# Patient Record
Sex: MALE | Race: WHITE | NOT HISPANIC OR LATINO | Employment: OTHER | URBAN - METROPOLITAN AREA
[De-identification: names, ages, dates, MRNs, and addresses within clinical notes are randomized per-mention and may not be internally consistent; named-entity substitution may affect disease eponyms.]

---

## 2021-07-01 ENCOUNTER — OFFICE VISIT (OUTPATIENT)
Dept: URGENT CARE | Facility: CLINIC | Age: 56
End: 2021-07-01
Payer: COMMERCIAL

## 2021-07-01 VITALS
HEART RATE: 59 BPM | SYSTOLIC BLOOD PRESSURE: 164 MMHG | WEIGHT: 194 LBS | HEIGHT: 74 IN | BODY MASS INDEX: 24.9 KG/M2 | RESPIRATION RATE: 16 BRPM | DIASTOLIC BLOOD PRESSURE: 70 MMHG

## 2021-07-01 DIAGNOSIS — S90.829A FRICTION BLISTERS OF THE SOLES, UNSPECIFIED LATERALITY, INITIAL ENCOUNTER: Primary | ICD-10-CM

## 2021-07-01 PROCEDURE — 99203 OFFICE O/P NEW LOW 30 MIN: CPT | Performed by: PHYSICIAN ASSISTANT

## 2021-07-01 RX ORDER — CEPHALEXIN 500 MG/1
500 CAPSULE ORAL EVERY 12 HOURS SCHEDULED
Qty: 28 CAPSULE | Refills: 0 | Status: SHIPPED | OUTPATIENT
Start: 2021-07-01 | End: 2021-07-15

## 2021-07-01 NOTE — PROGRESS NOTES
[facility] [employer] [facility]        NAME: Jamshid Toscano is a 60 y o  male  : 1965    MRN: [id number]  DATE: 2021  TIME: 12:17 PM    Assessment and Plan   Friction blisters of the soles, unspecified laterality, initial encounter [J20 973G]  1  Friction blisters of the soles, unspecified laterality, initial encounter  cephalexin (KEFLEX) 500 mg capsule     Discussed with patient that wounds do not appear to be infected at this time but will prescribe antibiotic as he is traveling on the [place]  Patient a keep clean with soap and water  Also advised patient to consider wrapping gauze roll between toes to avoid friction  He is to avoid wet socks and shoes  Advised rest   He will monitor for signs of infection and begin taking antibiotic if this occurs  Discussed with patient worsening signs of infection and to seek evaluation of this occurs    Patient Instructions   Keep feet dry  Wrap toes with gauze  May have to consider time off from walking  Over-the-counter Tylenol and ibuprofen for pain  Monitor for signs of infection including redness, swelling, drainage, streaking up the extremity and fever  Follow up with PCP in 3-5 days  Proceed to  ER if symptoms worsen  Chief Complaint     Chief Complaint   Patient presents with    Toe Pain     5th toes on bilateral foot red and swollen  also c/o pain in ball of feet  Pt is [place] the [place]  History of Present Illness       Patient is a 43-year-old male with significant past medical history of hypertension and IBD presents the office complaining of bilateral foot pain for 1 week  Patient reports both his little toes are red and swollen  States they had blisters which have since popped  Denies fevers, chills, purulent drainage, or red streaking  Patient is currently traveling [place] the [address] from [facility]  Review of Systems   Review of Systems   Musculoskeletal: Positive for arthralgias   Negative for joint swelling  Skin: Positive for wound  Neurological: Negative for numbness  Current Medications       Current Outpatient Medications:     cephalexin (KEFLEX) 500 mg capsule, Take 1 capsule (500 mg total) by mouth every 12 (twelve) hours for 14 days, Disp: 28 capsule, Rfl: 0    Current Allergies     Allergies as of 07/01/2021    (No Known Allergies)            The following portions of the patient's history were reviewed and updated as appropriate: allergies, current medications, past family history, past medical history, past social history, past surgical history and problem list      Past Medical History:   Diagnosis Date    GERD (gastroesophageal reflux disease)     Hypertension     Inflammatory bowel disease        Past Surgical History:   Procedure Laterality Date    NO PAST SURGERIES         Family History   Problem Relation Age of Onset    Stroke Mother     Heart disease Mother     Cancer Father          Medications have been verified  Objective   /70   Pulse 59   Resp 16   Ht 6' 2" (1 88 m)   Wt 88 kg (194 lb)   BMI 24 91 kg/m²   No LMP for male patient  Physical Exam     Physical Exam  Vitals and nursing note reviewed  Constitutional:       Appearance: He is well-developed  HENT:      Head: Normocephalic and atraumatic  Right Ear: External ear normal       Left Ear: External ear normal       Nose: Nose normal    Eyes:      General: Lids are normal       Conjunctiva/sclera: Conjunctivae normal    Skin:     General: Skin is warm and dry  Capillary Refill: Capillary refill takes less than 2 seconds  Comments: 0 5 cm round unroofed blister to distal aspect of b/l little toes  Mild erythema and swelling  No red streaking or purulent drainage  TTP  Neurological:      Mental Status: He is alert

## 2021-07-01 NOTE — PATIENT INSTRUCTIONS
Keep feet dry  Wrap toes with gauze  May have to consider time off from walking  Over-the-counter Tylenol and ibuprofen for pain  Monitor for signs of infection including redness, swelling, drainage, streaking up the extremity and fever  Seek medical attention if you have these symptoms  Go to ER if symptoms become severe